# Patient Record
Sex: MALE | Race: WHITE
[De-identification: names, ages, dates, MRNs, and addresses within clinical notes are randomized per-mention and may not be internally consistent; named-entity substitution may affect disease eponyms.]

---

## 2017-08-16 ENCOUNTER — NON-APPOINTMENT (OUTPATIENT)
Age: 27
End: 2017-08-16

## 2017-08-16 ENCOUNTER — APPOINTMENT (OUTPATIENT)
Dept: INTERNAL MEDICINE | Facility: CLINIC | Age: 27
End: 2017-08-16
Payer: MEDICAID

## 2017-08-16 VITALS
SYSTOLIC BLOOD PRESSURE: 100 MMHG | BODY MASS INDEX: 29.06 KG/M2 | DIASTOLIC BLOOD PRESSURE: 60 MMHG | WEIGHT: 203 LBS | HEIGHT: 70 IN

## 2017-08-16 DIAGNOSIS — Z78.9 OTHER SPECIFIED HEALTH STATUS: ICD-10-CM

## 2017-08-16 DIAGNOSIS — Z00.00 ENCOUNTER FOR GENERAL ADULT MEDICAL EXAMINATION W/OUT ABNORMAL FINDINGS: ICD-10-CM

## 2017-08-16 DIAGNOSIS — F90.9 ATTENTION-DEFICIT HYPERACTIVITY DISORDER, UNSPECIFIED TYPE: ICD-10-CM

## 2017-08-16 PROCEDURE — 93000 ELECTROCARDIOGRAM COMPLETE: CPT

## 2017-08-16 PROCEDURE — 99385 PREV VISIT NEW AGE 18-39: CPT | Mod: 25

## 2017-08-16 RX ORDER — DEXTROAMPHETAMINE SACCHARATE, AMPHETAMINE ASPARTATE MONOHYDRATE, DEXTROAMPHETAMINE SULFATE AND AMPHETAMINE SULFATE 5; 5; 5; 5 MG/1; MG/1; MG/1; MG/1
20 CAPSULE, EXTENDED RELEASE ORAL
Qty: 30 | Refills: 0 | Status: ACTIVE | COMMUNITY
Start: 2017-06-23

## 2017-10-16 ENCOUNTER — HOSPITAL ENCOUNTER (EMERGENCY)
Dept: HOSPITAL 25 - UCCORT | Age: 27
Discharge: HOME | End: 2017-10-16
Payer: COMMERCIAL

## 2017-10-16 VITALS — SYSTOLIC BLOOD PRESSURE: 139 MMHG | DIASTOLIC BLOOD PRESSURE: 59 MMHG

## 2017-10-16 DIAGNOSIS — J02.0: Primary | ICD-10-CM

## 2017-10-16 PROCEDURE — 87651 STREP A DNA AMP PROBE: CPT

## 2017-10-16 PROCEDURE — G0463 HOSPITAL OUTPT CLINIC VISIT: HCPCS

## 2017-10-16 PROCEDURE — 99202 OFFICE O/P NEW SF 15 MIN: CPT

## 2017-10-16 NOTE — UC
Throat Pain/Nasal Derrick HPI





- HPI Summary


HPI Summary: 


Sore throat with white spots for 3 days subjective fevers











- History of Current Complaint


Chief Complaint: UCGeneralIllness


Stated Complaint: STREP THROAT


Time Seen by Provider: 10/16/17 09:15


Hx Obtained From: Patient


Hx From Patient Unobtainable Due To: Dementia


Onset/Duration: Sudden Onset, Lasting Days - 3


Severity: Moderate


Pain Intensity: 7


Pain Scale Used: 0-10 Numeric


Cough: None


Associated Signs & Symptoms: Positive: Fever





- Allergies/Home Medications


Allergies/Adverse Reactions: 


 Allergies











Allergy/AdvReac Type Severity Reaction Status Date / Time


 


No Known Allergies Allergy   Verified 10/16/17 09:31











Home Medications: 


 Home Medications





Amphetamine MIXED SALT TAB* [Adderall TAB*] 20 mg PO DAILY 10/16/17 [History 

Confirmed 10/16/17]


Ibuprofen TAB* [Advil TAB*] 400 mg PO Q6H PRN 10/16/17 [History Confirmed 10/16/

17]











PMH/Surg Hx/FS Hx/Imm Hx


Previously Healthy: No - Add





- Surgical History


Surgical History: Yes


Surgery Procedure, Year, and Place: Left Metacarpal Fracture





- Family History


Known Family History: Positive: None





- Social History


Occupation: Student


Lives: With Family


Alcohol Use: None


Substance Use Type: None


Smoking Status (MU): Never Smoked Tobacco





- Immunization History


Most Recent Influenza Vaccination: Not the 2017/2018 Season





Review of Systems


Constitutional: Fever, Chills, Fatigue


Skin: Negative


Eyes: Negative


ENT: Sore Throat


Respiratory: Negative


Cardiovascular: Negative


Gastrointestinal: Negative


Genitourinary: Negative


Motor: Negative


Neurovascular: Negative


Musculoskeletal: Negative


Neurological: Negative


Psychological: Negative


Is Patient Immunocompromised?: No


All Other Systems Reviewed And Are Negative: Yes





Physical Exam


Triage Information Reviewed: Yes


Appearance: Well-Appearing, Well-Nourished, Pain Distress - mild


Vital Signs: 


 Initial Vital Signs











Temp  98.4 F   10/16/17 09:19


 


Pulse  90   10/16/17 09:19


 


Resp  16   10/16/17 09:19


 


BP  132/72   10/16/17 09:19


 


Pulse Ox  99   10/16/17 09:19











Vital Signs Reviewed: Yes


Eye Exam: Normal


Eyes: Positive: Conjunctiva Clear


ENT Exam: Normal


ENT: Positive: Normal ENT inspection, Hearing grossly normal, Pharyngeal 

erythema, TMs normal.  Negative: Nasal congestion, Nasal drainage, Tonsillar 

swelling, Tonsillar exudate, Trismus, Muffled/hoarse voice


Dental Exam: Normal


Neck exam: Normal


Neck: Positive: Supple, Nontender, Enlarged Nodes @


Respiratory Exam: Normal


Respiratory: Positive: Chest non-tender, Lungs clear, Normal breath sounds, No 

respiratory distress


Cardiovascular Exam: Normal


Cardiovascular: Positive: RRR, No Murmur, Pulses Normal, Brisk Capillary Refill


Musculoskeletal Exam: Normal


Musculoskeletal: Positive: Strength Intact, ROM Intact


Neurological Exam: Normal


Neurological: Positive: Alert, Muscle Tone Normal


Psychological Exam: Normal


Skin Exam: Normal





Diagnostics





- Laboratory


Diagnostic Studies Completed/Ordered: RST (+)





Throat Pain/Nasal Course/Dx





- Course


Assessment/Plan: Amoxicillin, Ibuprofen, rest follow with pcp prn





- Differential Dx/Diagnosis


Provider Diagnoses: Strep Pharyngitis





Discharge





- Discharge Plan


Condition: Stable


Disposition: HOME


Prescriptions: 


Amoxicillin PO (*) [Amoxicillin 875 MG (*)] 875 mg PO BID #20 tab


Patient Education Materials:  Strep Throat (ED)


Forms:  *Work Release

## 2018-09-14 ENCOUNTER — HOSPITAL ENCOUNTER (EMERGENCY)
Dept: HOSPITAL 25 - UCCORT | Age: 28
Discharge: HOME | End: 2018-09-14
Payer: COMMERCIAL

## 2018-09-14 VITALS — DIASTOLIC BLOOD PRESSURE: 70 MMHG | SYSTOLIC BLOOD PRESSURE: 115 MMHG

## 2018-09-14 DIAGNOSIS — H61.21: Primary | ICD-10-CM

## 2018-09-14 PROCEDURE — 99213 OFFICE O/P EST LOW 20 MIN: CPT

## 2018-09-14 PROCEDURE — G0463 HOSPITAL OUTPT CLINIC VISIT: HCPCS

## 2018-09-14 NOTE — UC
Ear Complaint HPI





- HPI Summary


HPI Summary: 





Per RN triage "RIGHT EAR HEARING IS MUFFLED. HAS A "HISSING SOUND" IN HIS EAR. 

SYMPTOMS ON AND OFF FOR TWO WEEKS. NOT PINFUL"


-here w/ a friend. 





- History of Current Complaint


Chief Complaint: UCEar


Stated Complaint: RT EAR COMP


Time Seen by Provider: 09/14/18 23:13


Pain Intensity: 0





- Allergies/Home Medications


Allergies/Adverse Reactions: 


 Allergies











Allergy/AdvReac Type Severity Reaction Status Date / Time


 


No Known Allergies Allergy   Verified 09/14/18 22:31














PMH/Surg Hx/FS Hx/Imm Hx


Previously Healthy: Yes





- Surgical History


Surgical History: Yes


Surgery Procedure, Year, and Place: Left Metacarpal Fracture





- Family History


Known Family History: Positive: Hypertension





- Social History


Alcohol Use: None


Substance Use Type: None


Smoking Status (MU): Never Smoked Tobacco





- Immunization History


Most Recent Influenza Vaccination: Not the 2017/2018 Season





Review of Systems


Constitutional: Negative


Skin: Negative


Eyes: Negative


ENT: Other - decreased rt hearing


Respiratory: Negative


Cardiovascular: Negative


Gastrointestinal: Negative


Genitourinary: Negative


Motor: Negative


Neurovascular: Negative


Musculoskeletal: Negative


Neurological: Negative


Psychological: Negative


Is Patient Immunocompromised?: No


All Other Systems Reviewed And Are Negative: Yes





Physical Exam


Triage Information Reviewed: Yes


Appearance: Well-Appearing, No Pain Distress, Well-Nourished


Vital Signs: 


 Initial Vital Signs











Temp  98.9 F   09/14/18 22:32


 


Pulse  75   09/14/18 22:32


 


Resp  15   09/14/18 22:32


 


BP  115/70   09/14/18 22:32


 


Pulse Ox  98   09/14/18 22:32











Eye Exam: Normal


ENT: Positive: Pharynx normal - mild PND, no exudate, Other - rt canal w/ 

significant amt of dry, crusted cerumen imapcted. TM not visble..  Negative: 

Sinus tenderness


Neck exam: Normal


Neck: Positive: Supple, Nontender, No Lymphadenopathy


Respiratory Exam: Normal


Respiratory: Positive: Lungs clear, Normal breath sounds


Cardiovascular Exam: Normal


Cardiovascular: Positive: RRR





Ear Complaint Course/Dx





- Course


Course Of Treatment: re-eval after significant cerumen removed manualy. 

tolerated well. still some deep cerumen but declines further flushing.





- Differential Dx/Diagnosis


Differential Diagnosis/HQI/PQRI: Cerumen Impaction, Otitis Externa, Otitis Media


Provider Diagnoses: rt cerumen impaction





Discharge





- Sign-Out/Discharge


Documenting (check all that apply): Patient Departure


All imaging exams completed and their final reports reviewed: No Studies





- Discharge Plan


Condition: Stable


Disposition: HOME


Patient Education Materials:  Cerumen Impaction (ED)


Referrals: 


No Primary Care Phys,NOPCP [Primary Care Provider] - 


Additional Instructions: 


We were able to flush out a lot of wax from your right ear. There is still a 

small amount left that is deep, however you declined further manual irrigation. 

Recommend you use OTC debrox ear drops  5 drops daily x 5days. You can either 

come back here to have it flushed again or you can do it yourself with a bulb 

syringe. 





- Billing Disposition and Condition


Condition: STABLE


Disposition: Home